# Patient Record
Sex: FEMALE | URBAN - METROPOLITAN AREA
[De-identification: names, ages, dates, MRNs, and addresses within clinical notes are randomized per-mention and may not be internally consistent; named-entity substitution may affect disease eponyms.]

---

## 2023-11-06 ENCOUNTER — APPOINTMENT (RX ONLY)
Dept: URBAN - METROPOLITAN AREA CLINIC 86 | Facility: CLINIC | Age: 63
Setting detail: DERMATOLOGY
End: 2023-11-06

## 2023-11-06 VITALS — HEIGHT: 63 IN | WEIGHT: 140 LBS

## 2023-11-06 DIAGNOSIS — L80 VITILIGO: ICD-10-CM

## 2023-11-06 PROCEDURE — ? WOOD'S LAMP

## 2023-11-06 PROCEDURE — ? PRESCRIPTION

## 2023-11-06 PROCEDURE — ? COUNSELING

## 2023-11-06 PROCEDURE — 99203 OFFICE O/P NEW LOW 30 MIN: CPT

## 2023-11-06 PROCEDURE — ? TREATMENT REGIMEN

## 2023-11-06 RX ORDER — RUXOLITINIB 15 MG/G
CREAM TOPICAL BID
Qty: 60 | Refills: 5 | Status: ERX | COMMUNITY
Start: 2023-11-06

## 2023-11-06 RX ADMIN — RUXOLITINIB: 15 CREAM TOPICAL at 00:00

## 2023-11-06 ASSESSMENT — LOCATION ZONE DERM: LOCATION ZONE: LEG

## 2023-11-06 ASSESSMENT — LOCATION DETAILED DESCRIPTION DERM
LOCATION DETAILED: RIGHT DISTAL PRETIBIAL REGION
LOCATION DETAILED: LEFT LATERAL DISTAL PRETIBIAL REGION
LOCATION DETAILED: LEFT DISTAL PRETIBIAL REGION

## 2023-11-06 ASSESSMENT — LOCATION SIMPLE DESCRIPTION DERM
LOCATION SIMPLE: LEFT PRETIBIAL REGION
LOCATION SIMPLE: RIGHT PRETIBIAL REGION

## 2023-11-06 ASSESSMENT — SEVERITY VITILIGO: VITILIGO SEVERITY: 0

## 2023-11-06 ASSESSMENT — BSA VITILIGO: % BODY COVERED IN VITILIGO: 1

## 2023-11-06 NOTE — PROCEDURE: TREATMENT REGIMEN
Otc Regimen: SPF Mineral sunscreen
Discontinue Regimen: Tacrolimus
Samples Given: Opzelura 1.5% cream
Detail Level: Zone
Initiate Treatment: Lakshmi Amezquita \\nApply to vitiligo affected areas BID